# Patient Record
Sex: FEMALE | Race: WHITE | NOT HISPANIC OR LATINO | Employment: OTHER | URBAN - NONMETROPOLITAN AREA
[De-identification: names, ages, dates, MRNs, and addresses within clinical notes are randomized per-mention and may not be internally consistent; named-entity substitution may affect disease eponyms.]

---

## 2023-03-31 LAB
ANION GAP IN SER/PLAS: 9 MMOL/L (ref 10–20)
CALCIUM (MG/DL) IN SER/PLAS: 9.8 MG/DL (ref 8.6–10.3)
CARBON DIOXIDE, TOTAL (MMOL/L) IN SER/PLAS: 28 MMOL/L (ref 21–32)
CHLORIDE (MMOL/L) IN SER/PLAS: 107 MMOL/L (ref 98–107)
CREATININE (MG/DL) IN SER/PLAS: 0.98 MG/DL (ref 0.5–1.05)
GFR FEMALE: 66 ML/MIN/1.73M2
GLUCOSE (MG/DL) IN SER/PLAS: 86 MG/DL (ref 74–99)
POTASSIUM (MMOL/L) IN SER/PLAS: 4.1 MMOL/L (ref 3.5–5.3)
SODIUM (MMOL/L) IN SER/PLAS: 140 MMOL/L (ref 136–145)
UREA NITROGEN (MG/DL) IN SER/PLAS: 18 MG/DL (ref 6–23)

## 2023-05-08 ENCOUNTER — APPOINTMENT (OUTPATIENT)
Dept: PRIMARY CARE | Facility: CLINIC | Age: 58
End: 2023-05-08
Payer: COMMERCIAL

## 2023-06-19 ENCOUNTER — OFFICE VISIT (OUTPATIENT)
Dept: PRIMARY CARE | Facility: CLINIC | Age: 58
End: 2023-06-19
Payer: COMMERCIAL

## 2023-06-19 VITALS
HEIGHT: 65 IN | OXYGEN SATURATION: 99 % | WEIGHT: 178 LBS | SYSTOLIC BLOOD PRESSURE: 126 MMHG | HEART RATE: 87 BPM | DIASTOLIC BLOOD PRESSURE: 78 MMHG | BODY MASS INDEX: 29.66 KG/M2

## 2023-06-19 DIAGNOSIS — F32.0 DEPRESSION, MAJOR, SINGLE EPISODE, MILD (CMS-HCC): Primary | ICD-10-CM

## 2023-06-19 DIAGNOSIS — G43.809 OTHER MIGRAINE WITHOUT STATUS MIGRAINOSUS, NOT INTRACTABLE: ICD-10-CM

## 2023-06-19 DIAGNOSIS — Q21.12 PFO (PATENT FORAMEN OVALE) (HHS-HCC): ICD-10-CM

## 2023-06-19 DIAGNOSIS — R73.03 PREDIABETES: ICD-10-CM

## 2023-06-19 DIAGNOSIS — I15.9 SECONDARY HYPERTENSION: ICD-10-CM

## 2023-06-19 DIAGNOSIS — G47.33 OBSTRUCTIVE SLEEP APNEA, ADULT: ICD-10-CM

## 2023-06-19 DIAGNOSIS — K21.9 CHRONIC GERD: ICD-10-CM

## 2023-06-19 PROBLEM — G43.909 MIGRAINE: Status: ACTIVE | Noted: 2023-06-19

## 2023-06-19 PROCEDURE — 3074F SYST BP LT 130 MM HG: CPT | Performed by: INTERNAL MEDICINE

## 2023-06-19 PROCEDURE — 99214 OFFICE O/P EST MOD 30 MIN: CPT | Performed by: INTERNAL MEDICINE

## 2023-06-19 PROCEDURE — 1036F TOBACCO NON-USER: CPT | Performed by: INTERNAL MEDICINE

## 2023-06-19 PROCEDURE — 3078F DIAST BP <80 MM HG: CPT | Performed by: INTERNAL MEDICINE

## 2023-06-19 RX ORDER — IRBESARTAN 75 MG/1
1 TABLET ORAL DAILY
COMMUNITY

## 2023-06-19 RX ORDER — BUPROPION HYDROCHLORIDE 300 MG/1
300 TABLET ORAL
COMMUNITY
End: 2023-10-05 | Stop reason: SDUPTHER

## 2023-06-19 RX ORDER — TOPIRAMATE 50 MG/1
1 TABLET, FILM COATED ORAL DAILY
COMMUNITY
Start: 2019-10-12 | End: 2023-06-19 | Stop reason: SDUPTHER

## 2023-06-19 RX ORDER — TOPIRAMATE 50 MG/1
50 TABLET, FILM COATED ORAL 2 TIMES DAILY
Qty: 60 TABLET | Refills: 5 | Status: SHIPPED | OUTPATIENT
Start: 2023-06-19 | End: 2023-06-22 | Stop reason: SDUPTHER

## 2023-06-19 RX ORDER — AMLODIPINE BESYLATE 2.5 MG/1
2.5 TABLET ORAL DAILY
Qty: 90 TABLET | Refills: 1 | Status: SHIPPED | OUTPATIENT
Start: 2023-06-19 | End: 2023-12-18 | Stop reason: WASHOUT

## 2023-06-19 RX ORDER — PROTRIPTYLINE HYDROCHLORIDE 5 MG/1
1 TABLET, FILM COATED ORAL 2 TIMES DAILY
COMMUNITY
Start: 2018-06-30 | End: 2023-12-18 | Stop reason: WASHOUT

## 2023-06-19 RX ORDER — ASPIRIN 81 MG/1
81 TABLET ORAL
COMMUNITY

## 2023-06-19 RX ORDER — AMLODIPINE BESYLATE 2.5 MG/1
1 TABLET ORAL DAILY
COMMUNITY
Start: 2022-10-03 | End: 2023-06-19 | Stop reason: SDUPTHER

## 2023-06-19 RX ORDER — METFORMIN HYDROCHLORIDE 500 MG/1
1000 TABLET, EXTENDED RELEASE ORAL 2 TIMES DAILY
COMMUNITY
Start: 2017-06-04 | End: 2023-12-18 | Stop reason: SDUPTHER

## 2023-06-19 RX ORDER — OMEPRAZOLE 40 MG/1
1 CAPSULE, DELAYED RELEASE ORAL
COMMUNITY
Start: 2022-04-19 | End: 2024-02-08

## 2023-06-19 ASSESSMENT — ENCOUNTER SYMPTOMS
PALPITATIONS: 0
BLOOD IN STOOL: 0
WHEEZING: 0
VOMITING: 0
CHEST TIGHTNESS: 0
FATIGUE: 0
SHORTNESS OF BREATH: 0
NAUSEA: 0
BACK PAIN: 0
DIARRHEA: 0
ABDOMINAL PAIN: 0
COUGH: 0
ARTHRALGIAS: 0

## 2023-06-19 NOTE — ASSESSMENT & PLAN NOTE
-She will check her over-the-counter preparation as she might be only taking 20 mg.  She will let us know if she continues to have reflux symptoms despite adequate treatment as we would have her go back to Dr. Stephenson

## 2023-06-19 NOTE — PATIENT INSTRUCTIONS
Please contact us if your headaches are not doing better as we are having you take Topamax twice daily  Please remember to take the omeprazole 40 mg daily and if you continue to have reflux all you need to do is call and we will refer you to Dr. Suero  We will try to track down the results of your last colonoscopy  If everything goes according to plan I will see you back in 6 months and please remember to get fasting lab work prior to that visit

## 2023-06-19 NOTE — ASSESSMENT & PLAN NOTE
-She will continue with her metformin 500 mg daily  -We will run a hemoglobin A1c just prior to her next follow-up visit

## 2023-06-19 NOTE — ASSESSMENT & PLAN NOTE
-Currently well controlled  -We are providing a refill on amlodipine 2.5 mg once daily  -She will continue with Avapro 75 mg daily

## 2023-06-19 NOTE — PROGRESS NOTES
Subjective   Patient ID: Carmen Jay is a 57 y.o. female who presents for No chief complaint on file..  HPI  She is here today for her routine checkup.  She is looking well.  We did conduct a review of systems and she states that she is getting some heartburn symptoms on a more frequent basis.  She been taking over-the-counter omeprazole and I suspect that she might be only taking the 20 mg dose whereas in the prescription she was on 40 mg.  She will call us and let us know.  She has no dysphagia symptoms or alarm type symptoms.  We talked about having her go back to Dr. Suero if she is having continued symptoms despite adequate treatment.  We also reminded that she had a colonoscopy we suspect a little under 10 years ago.  I could not find a copy of that report so we will have her sign to get that sent today.  We also reviewed her laboratory test results from just a couple of months ago and overall I am pleased with her numbers.  She states she has been getting some headaches over the past few days and we are reminded that she does suffer from migraines.  She is on Topamax.  She will call if her migraines continue.  She also continues to wear her CPAP device faithfully.  I will summarize everything in a problem based format.  Review of Systems   Constitutional:  Negative for fatigue.   Respiratory:  Negative for cough, chest tightness, shortness of breath and wheezing.    Cardiovascular:  Negative for chest pain, palpitations and leg swelling.   Gastrointestinal:  Negative for abdominal pain, blood in stool, diarrhea, nausea and vomiting.   Musculoskeletal:  Negative for arthralgias and back pain.     Objective   Physical Exam  Vitals and nursing note reviewed.   Constitutional:       General: She is not in acute distress.     Appearance: Normal appearance.   HENT:      Head: Normocephalic and atraumatic.   Eyes:      Conjunctiva/sclera: Conjunctivae normal.   Cardiovascular:      Rate and Rhythm: Normal rate  and regular rhythm.      Heart sounds: Normal heart sounds.   Pulmonary:      Effort: No respiratory distress.      Breath sounds: No wheezing.   Abdominal:      Palpations: Abdomen is soft.      Tenderness: There is no abdominal tenderness. There is no guarding.   Musculoskeletal:         General: No swelling. Normal range of motion.   Skin:     General: Skin is warm and dry.   Neurological:      General: No focal deficit present.      Mental Status: She is alert and oriented to person, place, and time.   Psychiatric:         Behavior: Behavior normal.       Recent Results (from the past 2016 hour(s))   Basic Metabolic Panel    Collection Time: 03/31/23 10:56 AM   Result Value Ref Range    Glucose 86 74 - 99 mg/dL    Sodium 140 136 - 145 mmol/L    Potassium 4.1 3.5 - 5.3 mmol/L    Chloride 107 98 - 107 mmol/L    Bicarbonate 28 21 - 32 mmol/L    Anion Gap 9 (L) 10 - 20 mmol/L    Urea Nitrogen 18 6 - 23 mg/dL    Creatinine 0.98 0.50 - 1.05 mg/dL    GFR Female 66 >90 mL/min/1.73m2    Calcium 9.8 8.6 - 10.3 mg/dL       Assessment/Plan   Problem List Items Addressed This Visit          Nervous    Obstructive sleep apnea, adult     -She continues to wear her CPAP device faithfully and has derived benefit from its use            Circulatory    Hypertension     -Currently well controlled  -We are providing a refill on amlodipine 2.5 mg once daily  -She will continue with Avapro 75 mg daily         Relevant Medications    amLODIPine (Norvasc) 2.5 mg tablet    PFO (patent foramen ovale)     -She sees Dr. Reed with follow-up echocardiograms annually  -Condition has been stable         Relevant Medications    amLODIPine (Norvasc) 2.5 mg tablet       Digestive    Chronic GERD     -She will check her over-the-counter preparation as she might be only taking 20 mg.  She will let us know if she continues to have reflux symptoms despite adequate treatment as we would have her go back to Dr. Stephenson            Endocrine/Metabolic     Prediabetes     -She will continue with her metformin 500 mg daily  -We will run a hemoglobin A1c just prior to her next follow-up visit         Relevant Orders    Basic Metabolic Panel    Hemoglobin A1C       Other    Depression, major, single episode, mild (CMS/HCC) - Primary     -Doing well and continue with Wellbutrin 300 mg daily         Migraine     -She will continue with her Topamax 50 mg and if headaches continue we can adjust the dose         Relevant Medications    topiramate (Topamax) 50 mg tablet          Katya Martinez,

## 2023-06-22 DIAGNOSIS — G43.809 OTHER MIGRAINE WITHOUT STATUS MIGRAINOSUS, NOT INTRACTABLE: ICD-10-CM

## 2023-06-22 RX ORDER — TOPIRAMATE 50 MG/1
50 TABLET, FILM COATED ORAL 2 TIMES DAILY
Qty: 180 TABLET | Refills: 1 | Status: SHIPPED | OUTPATIENT
Start: 2023-06-22 | End: 2023-12-18 | Stop reason: SDUPTHER

## 2023-10-05 DIAGNOSIS — F32.0 DEPRESSION, MAJOR, SINGLE EPISODE, MILD (CMS-HCC): Primary | ICD-10-CM

## 2023-10-05 RX ORDER — BUPROPION HYDROCHLORIDE 300 MG/1
300 TABLET ORAL
Qty: 90 TABLET | Refills: 1 | Status: SHIPPED | OUTPATIENT
Start: 2023-10-05 | End: 2023-12-18 | Stop reason: SDUPTHER

## 2023-12-07 ENCOUNTER — APPOINTMENT (OUTPATIENT)
Dept: GASTROENTEROLOGY | Facility: CLINIC | Age: 58
End: 2023-12-07
Payer: COMMERCIAL

## 2023-12-18 ENCOUNTER — OFFICE VISIT (OUTPATIENT)
Dept: PRIMARY CARE | Facility: CLINIC | Age: 58
End: 2023-12-18
Payer: COMMERCIAL

## 2023-12-18 ENCOUNTER — LAB (OUTPATIENT)
Dept: LAB | Facility: LAB | Age: 58
End: 2023-12-18
Payer: COMMERCIAL

## 2023-12-18 VITALS
SYSTOLIC BLOOD PRESSURE: 114 MMHG | WEIGHT: 185 LBS | HEART RATE: 96 BPM | BODY MASS INDEX: 30.79 KG/M2 | DIASTOLIC BLOOD PRESSURE: 86 MMHG | OXYGEN SATURATION: 98 %

## 2023-12-18 DIAGNOSIS — Z23 ENCOUNTER FOR IMMUNIZATION: ICD-10-CM

## 2023-12-18 DIAGNOSIS — G43.809 OTHER MIGRAINE WITHOUT STATUS MIGRAINOSUS, NOT INTRACTABLE: ICD-10-CM

## 2023-12-18 DIAGNOSIS — K21.9 CHRONIC GERD: ICD-10-CM

## 2023-12-18 DIAGNOSIS — R73.03 PREDIABETES: Primary | ICD-10-CM

## 2023-12-18 DIAGNOSIS — R73.03 PREDIABETES: ICD-10-CM

## 2023-12-18 DIAGNOSIS — I15.9 SECONDARY HYPERTENSION: ICD-10-CM

## 2023-12-18 DIAGNOSIS — R74.8 ELEVATED VITAMIN B12 LEVEL: ICD-10-CM

## 2023-12-18 DIAGNOSIS — F32.0 DEPRESSION, MAJOR, SINGLE EPISODE, MILD (CMS-HCC): ICD-10-CM

## 2023-12-18 PROBLEM — G45.9 TRANSIENT ISCHEMIC ATTACK: Status: RESOLVED | Noted: 2023-06-27 | Resolved: 2023-12-18

## 2023-12-18 PROBLEM — R79.89 ELEVATED VITAMIN B12 LEVEL: Status: ACTIVE | Noted: 2023-12-18

## 2023-12-18 PROBLEM — K58.9 IRRITABLE BOWEL SYNDROME: Status: RESOLVED | Noted: 2023-06-27 | Resolved: 2023-12-18

## 2023-12-18 LAB
ANION GAP SERPL CALC-SCNC: 10 MMOL/L (ref 10–20)
BUN SERPL-MCNC: 16 MG/DL (ref 6–23)
CALCIUM SERPL-MCNC: 9.5 MG/DL (ref 8.6–10.3)
CHLORIDE SERPL-SCNC: 109 MMOL/L (ref 98–107)
CO2 SERPL-SCNC: 27 MMOL/L (ref 21–32)
CREAT SERPL-MCNC: 1.02 MG/DL (ref 0.5–1.05)
EST. AVERAGE GLUCOSE BLD GHB EST-MCNC: 114 MG/DL
GFR SERPL CREATININE-BSD FRML MDRD: 63 ML/MIN/1.73M*2
GLUCOSE SERPL-MCNC: 86 MG/DL (ref 74–99)
HBA1C MFR BLD: 5.6 %
POTASSIUM SERPL-SCNC: 4.2 MMOL/L (ref 3.5–5.3)
SODIUM SERPL-SCNC: 142 MMOL/L (ref 136–145)

## 2023-12-18 PROCEDURE — 36415 COLL VENOUS BLD VENIPUNCTURE: CPT

## 2023-12-18 PROCEDURE — 99214 OFFICE O/P EST MOD 30 MIN: CPT | Performed by: INTERNAL MEDICINE

## 2023-12-18 PROCEDURE — 1036F TOBACCO NON-USER: CPT | Performed by: INTERNAL MEDICINE

## 2023-12-18 PROCEDURE — 90686 IIV4 VACC NO PRSV 0.5 ML IM: CPT | Performed by: INTERNAL MEDICINE

## 2023-12-18 PROCEDURE — 90471 IMMUNIZATION ADMIN: CPT | Performed by: INTERNAL MEDICINE

## 2023-12-18 PROCEDURE — 80048 BASIC METABOLIC PNL TOTAL CA: CPT

## 2023-12-18 PROCEDURE — 83036 HEMOGLOBIN GLYCOSYLATED A1C: CPT

## 2023-12-18 PROCEDURE — 3079F DIAST BP 80-89 MM HG: CPT | Performed by: INTERNAL MEDICINE

## 2023-12-18 PROCEDURE — 3074F SYST BP LT 130 MM HG: CPT | Performed by: INTERNAL MEDICINE

## 2023-12-18 RX ORDER — BUPROPION HYDROCHLORIDE 300 MG/1
300 TABLET ORAL
Qty: 90 TABLET | Refills: 1 | Status: SHIPPED | OUTPATIENT
Start: 2023-12-18

## 2023-12-18 RX ORDER — METFORMIN HYDROCHLORIDE 500 MG/1
1000 TABLET, EXTENDED RELEASE ORAL
Qty: 360 TABLET | Refills: 1 | Status: SHIPPED | OUTPATIENT
Start: 2023-12-18

## 2023-12-18 RX ORDER — TOPIRAMATE 50 MG/1
50 TABLET, FILM COATED ORAL 2 TIMES DAILY
Qty: 180 TABLET | Refills: 1 | Status: SHIPPED | OUTPATIENT
Start: 2023-12-18 | End: 2024-02-12

## 2023-12-18 ASSESSMENT — PATIENT HEALTH QUESTIONNAIRE - PHQ9
2. FEELING DOWN, DEPRESSED OR HOPELESS: NOT AT ALL
SUM OF ALL RESPONSES TO PHQ9 QUESTIONS 1 AND 2: 0
SUM OF ALL RESPONSES TO PHQ9 QUESTIONS 1 AND 2: 0
1. LITTLE INTEREST OR PLEASURE IN DOING THINGS: NOT AT ALL
2. FEELING DOWN, DEPRESSED OR HOPELESS: NOT AT ALL
1. LITTLE INTEREST OR PLEASURE IN DOING THINGS: NOT AT ALL

## 2023-12-18 ASSESSMENT — ENCOUNTER SYMPTOMS
PALPITATIONS: 0
DIARRHEA: 0
ABDOMINAL PAIN: 0
ROS GI COMMENTS: OCC HEARTBURN
WHEEZING: 0
FATIGUE: 0
BACK PAIN: 0
VOMITING: 0
COUGH: 0
ARTHRALGIAS: 0
SHORTNESS OF BREATH: 0
NAUSEA: 0
BLOOD IN STOOL: 0

## 2023-12-18 NOTE — ASSESSMENT & PLAN NOTE
-She has been on metformin the equivalent of 1000 mg twice daily  -We will contact her once her hemoglobin A1c result comes back

## 2023-12-18 NOTE — PATIENT INSTRUCTIONS
As we discussed once your test results come back I will contact you  We would like to see you back in approximately 6 months for reevaluation and please remember to get lab work done prior to that visit  If you discover that a weight loss drug is covered in your plan please let me know and we can try to work things out

## 2023-12-18 NOTE — PROGRESS NOTES
Subjective   Patient ID: Carmen Jay is a 58 y.o. female who presents for Follow-up (6 MO FUV. ).  HPI  She is here today for her 6-month checkup.  She is having a lot of stress recently and helping her father who has had some problems.  She states as a result her weight is going up and she has expressed interest in getting on one of the GLP-1 inhibitors for weight loss.  I told her the next course of action would be to check with her insurance plan to see which ones are covered as they are notoriously expensive.  We also conducted a full review of systems and she is having problems with her heartburn.  She states she will be seeing her gastroenterologist.  She does take over-the-counter omeprazole because her insurance would not cover prescription for that drug.  She also was discovered to have a very high B12 level despite really not taking a supplement.  She is following with a hematologist.  She also has had some genetic testing for cancers as she does have a strong family history of breast cancer.  She states her sister has a positive BRCA gene.  She also had some changes in her medication since we saw her last time.  She is no longer on the amlodipine or the Vivactil.  Her blood pressure is excellent today.  We are providing refills on the medicines that we normally give her.  We also will be trying to give her the flu vaccine today.  Otherwise she went for some lab work this morning and the results are not back yet so I will be contacting her.  She also had lab work done back in November and I did review that today as well.  If everything is according to plan we will see her back in 6 months for reevaluation.  Review of Systems   Constitutional:  Negative for fatigue.   Respiratory:  Negative for cough, shortness of breath and wheezing.    Cardiovascular:  Negative for chest pain, palpitations and leg swelling.   Gastrointestinal:  Negative for abdominal pain, blood in stool, diarrhea, nausea and  vomiting.        Occ heartburn   Musculoskeletal:  Negative for arthralgias and back pain.     Objective   Physical Exam  Vitals and nursing note reviewed.   Constitutional:       General: She is not in acute distress.     Appearance: Normal appearance.   HENT:      Head: Normocephalic and atraumatic.   Eyes:      Conjunctiva/sclera: Conjunctivae normal.   Cardiovascular:      Rate and Rhythm: Normal rate and regular rhythm.      Heart sounds: Normal heart sounds.   Pulmonary:      Effort: No respiratory distress.      Breath sounds: No wheezing.   Abdominal:      Palpations: Abdomen is soft.      Tenderness: There is no abdominal tenderness. There is no guarding.   Musculoskeletal:         General: No swelling. Normal range of motion.   Skin:     General: Skin is warm and dry.   Neurological:      General: No focal deficit present.      Mental Status: She is alert and oriented to person, place, and time.   Psychiatric:         Behavior: Behavior normal.       Assessment/Plan   Problem List Items Addressed This Visit             ICD-10-CM    Chronic GERD K21.9     -She continues to take omeprazole and she does follow with the gastroenterology         Depression, major, single episode, mild (CMS/HCC) F32.0     -She will continue with the Wellbutrin  mg daily         Relevant Medications    buPROPion XL (Wellbutrin XL) 300 mg 24 hr tablet    Hypertension I10     -Blood pressure is currently well-controlled  -She will remain on Avapro 75 mg daily         Migraine G43.909     -Currently adequately controlled and she will remain on Topamax 50 mg twice daily         Relevant Medications    topiramate (Topamax) 50 mg tablet    Prediabetes - Primary R73.03     -She has been on metformin the equivalent of 1000 mg twice daily  -We will contact her once her hemoglobin A1c result comes back         Relevant Medications    metFORMIN  mg 24 hr tablet    Other Relevant Orders    Basic Metabolic Panel    Hemoglobin A1C     Elevated vitamin B12 level R74.8     -She is currently following with a hematologist, Dr. Yan Martinez, DO

## 2023-12-19 RX ORDER — SEMAGLUTIDE 0.25 MG/.5ML
0.25 INJECTION, SOLUTION SUBCUTANEOUS
Qty: 2 ML | Refills: 1 | Status: SHIPPED | OUTPATIENT
Start: 2023-12-19 | End: 2024-02-08 | Stop reason: ALTCHOICE

## 2023-12-19 NOTE — RESULT ENCOUNTER NOTE
"This is just an FYI.  I called to let her know that her lab work looked good.  She states she also check with her insurance plan and that Wegovy was \"covered \".  So I sent the prescription to her pharmacy of choice and I told her if she is able to secure the medicine that I would like to see her back in approximately 4 weeks to follow-up.  She expressed understanding and will let us know"

## 2024-02-08 ENCOUNTER — OFFICE VISIT (OUTPATIENT)
Dept: GASTROENTEROLOGY | Facility: CLINIC | Age: 59
End: 2024-02-08
Payer: COMMERCIAL

## 2024-02-08 VITALS
HEIGHT: 65 IN | DIASTOLIC BLOOD PRESSURE: 100 MMHG | WEIGHT: 175.8 LBS | BODY MASS INDEX: 29.29 KG/M2 | SYSTOLIC BLOOD PRESSURE: 140 MMHG

## 2024-02-08 DIAGNOSIS — Z12.11 COLON CANCER SCREENING: ICD-10-CM

## 2024-02-08 DIAGNOSIS — K21.9 CHRONIC GERD: Primary | ICD-10-CM

## 2024-02-08 DIAGNOSIS — K21.9 GASTROESOPHAGEAL REFLUX DISEASE, UNSPECIFIED WHETHER ESOPHAGITIS PRESENT: ICD-10-CM

## 2024-02-08 PROCEDURE — 99214 OFFICE O/P EST MOD 30 MIN: CPT | Performed by: INTERNAL MEDICINE

## 2024-02-08 PROCEDURE — 1036F TOBACCO NON-USER: CPT | Performed by: INTERNAL MEDICINE

## 2024-02-08 PROCEDURE — 3080F DIAST BP >= 90 MM HG: CPT | Performed by: INTERNAL MEDICINE

## 2024-02-08 PROCEDURE — 3008F BODY MASS INDEX DOCD: CPT | Performed by: INTERNAL MEDICINE

## 2024-02-08 PROCEDURE — 3077F SYST BP >= 140 MM HG: CPT | Performed by: INTERNAL MEDICINE

## 2024-02-08 RX ORDER — PANTOPRAZOLE SODIUM 40 MG/1
40 TABLET, DELAYED RELEASE ORAL DAILY
Qty: 30 TABLET | Refills: 11 | Status: SHIPPED | OUTPATIENT
Start: 2024-02-08 | End: 2024-04-03 | Stop reason: SDUPTHER

## 2024-02-08 ASSESSMENT — ENCOUNTER SYMPTOMS
CONSTIPATION: 1
EYES NEGATIVE: 1
RESPIRATORY NEGATIVE: 1
NEUROLOGICAL NEGATIVE: 1
CARDIOVASCULAR NEGATIVE: 1
ENDOCRINE NEGATIVE: 1
NAUSEA: 1
DIARRHEA: 1
ABDOMINAL PAIN: 1
HEMATOLOGIC/LYMPHATIC NEGATIVE: 1
CONSTITUTIONAL NEGATIVE: 1

## 2024-02-08 NOTE — PROGRESS NOTES
Subjective   Patient ID: Carmen Jay is a 58 y.o. female who presents for Follow-up (GERD. Patient is taking Nexium but eating TUMS like crazy. Sister with BRCA 1+ double mastectomy since patients last visit. Patient is in line to be tested in May. Aunt  of Colon Cancer. Father with Colon Polyps. ), Diarrhea (Patient states she has increased stress in the past couple months), and Constipation.  Diarrhea   Associated symptoms include abdominal pain.   Constipation  Associated symptoms include abdominal pain, diarrhea and nausea.   Patient is seen today in follow-up.  Was evaluated 1 year ago reflux remains despite weight loss.  7 nocturnal heartburn on a daily basis currently on omeprazole.  Denies any dysphagia.  Patient is due for routine screening colonoscopy continues with moderate constipation.    Review of Systems   Constitutional: Negative.    HENT: Negative.     Eyes: Negative.    Respiratory: Negative.     Cardiovascular: Negative.    Gastrointestinal:  Positive for abdominal pain, constipation, diarrhea and nausea.   Endocrine: Negative.    Genitourinary: Negative.    Neurological: Negative.    Hematological: Negative.        Objective   Physical Exam  Vitals and nursing note reviewed.   Constitutional:       Appearance: Normal appearance.   HENT:      Head: Normocephalic.      Mouth/Throat:      Mouth: Mucous membranes are moist.      Pharynx: Oropharynx is clear.   Eyes:      Conjunctiva/sclera: Conjunctivae normal.      Pupils: Pupils are equal, round, and reactive to light.   Cardiovascular:      Pulses: Normal pulses.      Heart sounds: Normal heart sounds.   Pulmonary:      Effort: Pulmonary effort is normal.      Breath sounds: Normal breath sounds.   Abdominal:      General: Abdomen is flat. Bowel sounds are normal.      Palpations: Abdomen is soft.   Musculoskeletal:      Cervical back: Normal range of motion and neck supple.   Skin:     General: Skin is warm and dry.   Neurological:       General: No focal deficit present.      Mental Status: She is alert and oriented to person, place, and time.   Psychiatric:         Behavior: Behavior normal.         Assessment/Plan   Diagnoses and all orders for this visit:  Chronic GERD  Colon cancer screening  -     Colonoscopy Screening; High Risk Patient; Future  Gastroesophageal reflux disease, unspecified whether esophagitis present  -     EGD; Future    Discontinue omeprazole begin Protonix 40 mg daily arrange EGD and colonoscopy       Sid Suero DO 02/08/24 11:40 AM

## 2024-02-11 DIAGNOSIS — G43.809 OTHER MIGRAINE WITHOUT STATUS MIGRAINOSUS, NOT INTRACTABLE: ICD-10-CM

## 2024-02-12 RX ORDER — TOPIRAMATE 50 MG/1
50 TABLET, FILM COATED ORAL 2 TIMES DAILY
Qty: 180 TABLET | Refills: 1 | Status: SHIPPED | OUTPATIENT
Start: 2024-02-12

## 2024-03-05 ENCOUNTER — HOSPITAL ENCOUNTER (OUTPATIENT)
Dept: GASTROENTEROLOGY | Facility: CLINIC | Age: 59
Setting detail: OUTPATIENT SURGERY
Discharge: HOME | End: 2024-03-05
Payer: COMMERCIAL

## 2024-03-05 VITALS
SYSTOLIC BLOOD PRESSURE: 126 MMHG | TEMPERATURE: 97.1 F | OXYGEN SATURATION: 98 % | DIASTOLIC BLOOD PRESSURE: 85 MMHG | RESPIRATION RATE: 18 BRPM | BODY MASS INDEX: 29.42 KG/M2 | WEIGHT: 176.8 LBS | HEART RATE: 56 BPM

## 2024-03-05 DIAGNOSIS — Z12.11 COLON CANCER SCREENING: Primary | ICD-10-CM

## 2024-03-05 DIAGNOSIS — K21.9 GASTROESOPHAGEAL REFLUX DISEASE, UNSPECIFIED WHETHER ESOPHAGITIS PRESENT: ICD-10-CM

## 2024-03-05 PROCEDURE — 43239 EGD BIOPSY SINGLE/MULTIPLE: CPT | Performed by: INTERNAL MEDICINE

## 2024-03-05 PROCEDURE — 3700000012 HC SEDATION LEVEL 5+ TIME - INITIAL 15 MINUTES 5/> YEARS

## 2024-03-05 PROCEDURE — 45385 COLONOSCOPY W/LESION REMOVAL: CPT | Performed by: INTERNAL MEDICINE

## 2024-03-05 PROCEDURE — 3700000013 HC SEDATION LEVEL 5+ TIME - EACH ADDITIONAL 15 MINUTES

## 2024-03-05 PROCEDURE — 88305 TISSUE EXAM BY PATHOLOGIST: CPT | Performed by: PATHOLOGY

## 2024-03-05 PROCEDURE — 2500000004 HC RX 250 GENERAL PHARMACY W/ HCPCS (ALT 636 FOR OP/ED): Performed by: INTERNAL MEDICINE

## 2024-03-05 PROCEDURE — 88305 TISSUE EXAM BY PATHOLOGIST: CPT | Mod: TC,SUR,SAMLAB,WESLAB | Performed by: INTERNAL MEDICINE

## 2024-03-05 PROCEDURE — 7100000009 HC PHASE TWO TIME - INITIAL BASE CHARGE

## 2024-03-05 PROCEDURE — 99152 MOD SED SAME PHYS/QHP 5/>YRS: CPT | Performed by: INTERNAL MEDICINE

## 2024-03-05 PROCEDURE — 2500000005 HC RX 250 GENERAL PHARMACY W/O HCPCS: Performed by: INTERNAL MEDICINE

## 2024-03-05 PROCEDURE — 99153 MOD SED SAME PHYS/QHP EA: CPT | Performed by: INTERNAL MEDICINE

## 2024-03-05 PROCEDURE — 7100000010 HC PHASE TWO TIME - EACH INCREMENTAL 1 MINUTE

## 2024-03-05 RX ORDER — MEPERIDINE HYDROCHLORIDE 25 MG/ML
INJECTION INTRAMUSCULAR; INTRAVENOUS; SUBCUTANEOUS AS NEEDED
Status: COMPLETED | OUTPATIENT
Start: 2024-03-05 | End: 2024-03-05

## 2024-03-05 RX ORDER — MIDAZOLAM HYDROCHLORIDE 5 MG/ML
INJECTION, SOLUTION INTRAMUSCULAR; INTRAVENOUS AS NEEDED
Status: COMPLETED | OUTPATIENT
Start: 2024-03-05 | End: 2024-03-05

## 2024-03-05 RX ORDER — SODIUM CHLORIDE, SODIUM LACTATE, POTASSIUM CHLORIDE, CALCIUM CHLORIDE 600; 310; 30; 20 MG/100ML; MG/100ML; MG/100ML; MG/100ML
20 INJECTION, SOLUTION INTRAVENOUS CONTINUOUS
Status: DISCONTINUED | OUTPATIENT
Start: 2024-03-05 | End: 2024-03-06 | Stop reason: HOSPADM

## 2024-03-05 RX ADMIN — MEPERIDINE HYDROCHLORIDE 25 MG: 25 INJECTION INTRAMUSCULAR; INTRAVENOUS; SUBCUTANEOUS at 12:03

## 2024-03-05 RX ADMIN — SODIUM CHLORIDE, POTASSIUM CHLORIDE, SODIUM LACTATE AND CALCIUM CHLORIDE 20 ML/HR: 600; 310; 30; 20 INJECTION, SOLUTION INTRAVENOUS at 11:08

## 2024-03-05 RX ADMIN — MIDAZOLAM HYDROCHLORIDE 2.5 MG: 5 INJECTION, SOLUTION INTRAMUSCULAR; INTRAVENOUS at 12:07

## 2024-03-05 RX ADMIN — BENZOCAINE, BUTAMBEN, AND TETRACAINE HYDROCHLORIDE 2 SPRAY: .028; .004; .004 AEROSOL, SPRAY TOPICAL at 12:00

## 2024-03-05 RX ADMIN — MEPERIDINE HYDROCHLORIDE 25 MG: 25 INJECTION INTRAMUSCULAR; INTRAVENOUS; SUBCUTANEOUS at 12:07

## 2024-03-05 RX ADMIN — GLUCAGON HYDROCHLORIDE 1 MG: KIT at 12:16

## 2024-03-05 RX ADMIN — MIDAZOLAM HYDROCHLORIDE 1.5 MG: 5 INJECTION, SOLUTION INTRAMUSCULAR; INTRAVENOUS at 12:24

## 2024-03-05 RX ADMIN — MIDAZOLAM HYDROCHLORIDE 1 MG: 5 INJECTION, SOLUTION INTRAMUSCULAR; INTRAVENOUS at 12:18

## 2024-03-05 RX ADMIN — MIDAZOLAM HYDROCHLORIDE 2.5 MG: 5 INJECTION, SOLUTION INTRAMUSCULAR; INTRAVENOUS at 12:03

## 2024-03-05 ASSESSMENT — PAIN SCALES - GENERAL
PAINLEVEL_OUTOF10: 0 - NO PAIN
PAINLEVEL_OUTOF10: 2
PAINLEVEL_OUTOF10: 0 - NO PAIN

## 2024-03-05 ASSESSMENT — PAIN - FUNCTIONAL ASSESSMENT
PAIN_FUNCTIONAL_ASSESSMENT: 0-10

## 2024-03-05 ASSESSMENT — COLUMBIA-SUICIDE SEVERITY RATING SCALE - C-SSRS
6. HAVE YOU EVER DONE ANYTHING, STARTED TO DO ANYTHING, OR PREPARED TO DO ANYTHING TO END YOUR LIFE?: NO
2. HAVE YOU ACTUALLY HAD ANY THOUGHTS OF KILLING YOURSELF?: NO

## 2024-03-05 NOTE — DISCHARGE INSTRUCTIONS
Patient Instructions after a Colonoscopy      The anesthetics, sedatives or narcotics which were given to you today will be acting in your body for the next 24 hours, so you might feel a little sleepy or groggy.  This feeling should slowly wear off. Carefully read and follow the instructions.     You received sedation today:  - Do not drive or operate any machinery or power tools of any kind.   - No alcoholic beverages today, not even beer or wine.  - Do not make any important decisions or sign any legal documents.  - No over the counter medications that contain alcohol or that may cause drowsiness.  - Do not make any important decisions or sign any legal documents.    While it is common to experience mild to moderate abdominal distention, gas, or belching after your procedure, if any of these symptoms occur following discharge from the GI Lab or within one week of having your procedure, call the [facility] to be advised whether a visit to your nearest Urgent Care or Emergency Department is indicated.  Take this paper with you if you go.     - If you develop an allergic reaction to the medications that were given during your procedure such as difficulty breathing, rash, hives, severe nausea, vomiting or lightheadedness.  - If you experience chest pain, shortness of breath, severe abdominal pain, fevers and chills.  -If you develop signs and symptoms of bleeding such as blood in your spit, if your stools turn black, tarry, or bloody  - If you have not urinated within 8 hours following your procedure.  - If your IV site becomes painful, red, inflamed, or looks infected.    If you received a biopsy/polypectomy/sphincterotomy the following instructions apply below:    __ Do not use Aspirin containing products, non-steroidal medications or anti-coagulants for one week following your procedure. (Examples of these types of medications are: Advil, Arthrotec, Aleve, Coumadin, Ecotrin, Heparin, Ibuprofen,  Indocin, Motrin, Naprosyn, Nuprin, Plavix, Vioxx, and Voltarin, or their generic forms.  This list is not all-inclusive.  Check with your physician or pharmacist before resuming medications.)   __ Eat a soft diet today.  Avoid foods that are poorly digested for the next 24 hours.  These foods would include: nuts, beans, lettuce, red meats, and fried foods. Start with liquids and advance your diet as tolerated, gradually work up to eating solids.   __ Do not have a Barium Study or Enema for one week.    Your physician recommends the additional following instructions:    -You have a contact number available for emergencies. The signs and symptoms of potential delayed complications were discussed with you. You may return to normal activities tomorrow.  -Resume your previous diet.  -Continue your present medications.   -We are waiting for your pathology results.  -Your physician has recommended a repeat colonoscopy (date to be determined after pending pathology results are reviewed) for surveillance based on pathology results.  -The findings and recommendations have been discussed with you.  -The findings and recommendations were discussed with your family.  - Please see Medication Reconciliation Form for new medication/medications prescribed.       If you experience any problems or have any questions following discharge from the GI Lab, please call:        Nurse Signature                                                                        Date___________________                                                                            Patient/Responsible Party Signature                                        Date___________________Patient Instructions after an endoscopy or colonoscopy      The anesthetics, sedatives or narcotics which were given to you today will be acting in your body for the next 24 hours, so you might feel a little sleepy or groggy.  This feeling should slowly wear off. Carefully read and follow  the instructions.     You received sedation today:  - Do not drive or operate any machinery or power tools of any kind.   - No alcoholic beverages today, not even beer or wine.  - Do not make any important decisions or sign any legal documents.  - No over the counter medications that contain alcohol or that may cause drowsiness.  - Do not make any important decisions or sign any legal documents.    While it is common to experience mild to moderate abdominal distention, gas, or belching after your procedure, if any of these symptoms occur following discharge from the GI Lab or within one week of having your procedure, call the [facility] to be advised whether a visit to your nearest Urgent Care or Emergency Department is indicated.  Take this paper with you if you go.     - If you develop an allergic reaction to the medications that were given during your procedure such as difficulty breathing, rash, hives, severe nausea, vomiting or lightheadedness.- If you experience chest pain, shortness of breath, severe abdominal pain, fevers and chills.    -If you develop signs and symptoms of bleeding such as blood in your spit, if your stools turn black, tarry, or bloody    - If you have not urinated within 8 hours following your procedure.- If your IV site becomes painful, red, inflamed, or looks infected.

## 2024-03-05 NOTE — H&P
History Of Present Illness  Carmen Jay is a 58 y.o. female presenting with GERD presents for screening EGD and screening colonoscopy..     Past Medical History  Past Medical History:   Diagnosis Date    Depression     Hypertension     Insulin resistance     Irritable bowel syndrome 2023    Migraines     Patent foramen ovale     Patent foramen ovale    Personal history of other diseases of the circulatory system     History of atrial septal defect    PFO (patent foramen ovale) 2016    Formatting of this note might be different from the original. Closed 2009    Recurrent tia's Echo  normal no shunt. Last Assessment & Plan: Formatting of this note might be different from the original. Doing remarkably well she does have metabolic syndrome which I discussed the implications of increased risk of atherosclerosis but with a coronary score of 0 and her 10-year risk 1% LDL of 84 do    PONV (postoperative nausea and vomiting)     Sleep apnea     Transient ischemic attack 2023     Surgical History  Past Surgical History:   Procedure Laterality Date    CARPAL TUNNEL RELEASE       SECTION, LOW TRANSVERSE      CHOLECYSTECTOMY      HYSTERECTOMY      OTHER SURGICAL HISTORY  2019    Endometrial ablation    WRIST SURGERY       Social History  She reports that she has never smoked. She has never used smokeless tobacco. She reports that she does not currently use alcohol. She reports that she does not use drugs.    Family History  Family History   Problem Relation Name Age of Onset    Diabetes Mother Yamilex     Heart disease Mother Yamilex     No Known Problems Father      Cancer Paternal Grandfather Marcelo     Cancer Paternal Grandmother Genie     Ovarian cancer Paternal Grandmother Genie     Cancer Father's Sister Makenna     Colon polyps Sister Sweta         Allergies  Allergies   Allergen Reactions    Sulfamethoxazole-Trimethoprim Hives     Review of Systems  Pre-sedation Evaluation:  ASA  Classification - ASA 2 - Patient with mild systemic disease with no functional limitations  Mallampati Score - II (hard and soft palate, upper portion of tonsils anduvula visible)    Physical Exam  Vitals and nursing note reviewed.   Constitutional:       Appearance: Normal appearance.   HENT:      Head: Normocephalic.      Mouth/Throat:      Mouth: Mucous membranes are moist.      Pharynx: Oropharynx is clear.   Eyes:      Conjunctiva/sclera: Conjunctivae normal.      Pupils: Pupils are equal, round, and reactive to light.   Cardiovascular:      Pulses: Normal pulses.      Heart sounds: Normal heart sounds.   Pulmonary:      Effort: Pulmonary effort is normal.      Breath sounds: Normal breath sounds.   Abdominal:      General: Abdomen is flat. Bowel sounds are normal.      Palpations: Abdomen is soft.   Musculoskeletal:      Cervical back: Normal range of motion and neck supple.   Skin:     General: Skin is warm and dry.   Neurological:      General: No focal deficit present.      Mental Status: She is alert and oriented to person, place, and time.   Psychiatric:         Behavior: Behavior normal.          Last Recorded Vitals  Blood pressure 127/85, pulse 61, temperature 36.8 °C (98.2 °F), temperature source Temporal, resp. rate 18, weight 80.2 kg (176 lb 12.8 oz), SpO2 98 %.     Assessment/Plan   Problem List Items Addressed This Visit       Colon cancer screening - Primary    Relevant Orders    Colonoscopy Screening; High Risk Patient     Other Visit Diagnoses       Gastroesophageal reflux disease, unspecified whether esophagitis present        Relevant Orders    EGD               PTA/Current Medications:  (Not in a hospital admission)    Current Outpatient Medications   Medication Sig Dispense Refill    aspirin 81 mg EC tablet Take 1 tablet (81 mg) by mouth once daily.      buPROPion XL (Wellbutrin XL) 300 mg 24 hr tablet Take 1 tablet (300 mg) by mouth once daily. 90 tablet 1    irbesartan (Avapro) 75 mg  tablet Take 1 tablet (75 mg) by mouth once daily.      metFORMIN  mg 24 hr tablet Take 2 tablets (1,000 mg) by mouth 2 times a day with meals. 360 tablet 1    pantoprazole (ProtoNix) 40 mg EC tablet Take 1 tablet (40 mg) by mouth once daily. Do not crush, chew, or split. 30 tablet 11    topiramate (Topamax) 50 mg tablet TAKE 1 TABLET TWICE A  tablet 1     Current Facility-Administered Medications   Medication Dose Route Frequency Provider Last Rate Last Admin    lactated Ringer's infusion  20 mL/hr intravenous Continuous Sid Suero DO 20 mL/hr at 03/05/24 1108 20 mL/hr at 03/05/24 1108     Sid Suero DO

## 2024-03-12 LAB
LABORATORY COMMENT REPORT: NORMAL
PATH REPORT.FINAL DX SPEC: NORMAL
PATH REPORT.GROSS SPEC: NORMAL
PATH REPORT.TOTAL CANCER: NORMAL

## 2024-03-20 ENCOUNTER — TELEPHONE (OUTPATIENT)
Dept: GASTROENTEROLOGY | Facility: CLINIC | Age: 59
End: 2024-03-20
Payer: COMMERCIAL

## 2024-03-20 NOTE — TELEPHONE ENCOUNTER
PATIENT NOTIFIED AND VERBALIZED UNDERSTANDING     5 year repeat card made and filed    ----- Message from Sid Suero DO sent at 3/12/2024 12:18 PM EDT -----  Upper endoscopy revealed reflux change without Paez's.  No evidence of hiatal hernia.  Biopsies from duodenum revealed peptic duodenitis, this is seen in chronic NSAID use and can be seen with aspirin.  No Paez's or H. pylori noted.    Colonoscopy revealed a small adenomatous polyp for which repeat colonoscopy will be necessary in 5 years.

## 2024-04-03 DIAGNOSIS — K21.9 CHRONIC GERD: ICD-10-CM

## 2024-04-05 RX ORDER — PANTOPRAZOLE SODIUM 40 MG/1
40 TABLET, DELAYED RELEASE ORAL DAILY
Qty: 90 TABLET | Refills: 3 | Status: SHIPPED | OUTPATIENT
Start: 2024-04-05 | End: 2025-04-05

## 2024-06-17 ENCOUNTER — LAB (OUTPATIENT)
Dept: LAB | Facility: LAB | Age: 59
End: 2024-06-17
Payer: COMMERCIAL

## 2024-06-17 ENCOUNTER — APPOINTMENT (OUTPATIENT)
Dept: PRIMARY CARE | Facility: CLINIC | Age: 59
End: 2024-06-17
Payer: COMMERCIAL

## 2024-06-17 VITALS
HEART RATE: 70 BPM | SYSTOLIC BLOOD PRESSURE: 124 MMHG | DIASTOLIC BLOOD PRESSURE: 86 MMHG | OXYGEN SATURATION: 98 % | HEIGHT: 65 IN | BODY MASS INDEX: 30.66 KG/M2 | WEIGHT: 184 LBS

## 2024-06-17 DIAGNOSIS — G43.809 OTHER MIGRAINE WITHOUT STATUS MIGRAINOSUS, NOT INTRACTABLE: ICD-10-CM

## 2024-06-17 DIAGNOSIS — R73.03 PREDIABETES: ICD-10-CM

## 2024-06-17 DIAGNOSIS — R74.8 ELEVATED VITAMIN B12 LEVEL: ICD-10-CM

## 2024-06-17 DIAGNOSIS — I15.9 SECONDARY HYPERTENSION: Primary | ICD-10-CM

## 2024-06-17 DIAGNOSIS — K21.9 CHRONIC GERD: ICD-10-CM

## 2024-06-17 PROBLEM — Z12.11 COLON CANCER SCREENING: Status: RESOLVED | Noted: 2024-02-08 | Resolved: 2024-06-17

## 2024-06-17 PROBLEM — F32.0 DEPRESSION, MAJOR, SINGLE EPISODE, MILD (CMS-HCC): Status: RESOLVED | Noted: 2023-06-19 | Resolved: 2024-06-17

## 2024-06-17 LAB
ANION GAP SERPL CALC-SCNC: 11 MMOL/L (ref 10–20)
BUN SERPL-MCNC: 21 MG/DL (ref 6–23)
CALCIUM SERPL-MCNC: 9.5 MG/DL (ref 8.6–10.3)
CHLORIDE SERPL-SCNC: 107 MMOL/L (ref 98–107)
CO2 SERPL-SCNC: 26 MMOL/L (ref 21–32)
CREAT SERPL-MCNC: 0.94 MG/DL (ref 0.5–1.05)
EGFRCR SERPLBLD CKD-EPI 2021: 70 ML/MIN/1.73M*2
EST. AVERAGE GLUCOSE BLD GHB EST-MCNC: 111 MG/DL
GLUCOSE SERPL-MCNC: 83 MG/DL (ref 74–99)
HBA1C MFR BLD: 5.5 %
POTASSIUM SERPL-SCNC: 4.3 MMOL/L (ref 3.5–5.3)
SODIUM SERPL-SCNC: 140 MMOL/L (ref 136–145)

## 2024-06-17 PROCEDURE — 3079F DIAST BP 80-89 MM HG: CPT | Performed by: INTERNAL MEDICINE

## 2024-06-17 PROCEDURE — 99214 OFFICE O/P EST MOD 30 MIN: CPT | Performed by: INTERNAL MEDICINE

## 2024-06-17 PROCEDURE — 3008F BODY MASS INDEX DOCD: CPT | Performed by: INTERNAL MEDICINE

## 2024-06-17 PROCEDURE — 83036 HEMOGLOBIN GLYCOSYLATED A1C: CPT

## 2024-06-17 PROCEDURE — 3074F SYST BP LT 130 MM HG: CPT | Performed by: INTERNAL MEDICINE

## 2024-06-17 PROCEDURE — 36415 COLL VENOUS BLD VENIPUNCTURE: CPT

## 2024-06-17 PROCEDURE — 80048 BASIC METABOLIC PNL TOTAL CA: CPT

## 2024-06-17 PROCEDURE — 1036F TOBACCO NON-USER: CPT | Performed by: INTERNAL MEDICINE

## 2024-06-17 RX ORDER — PANTOPRAZOLE SODIUM 40 MG/1
40 TABLET, DELAYED RELEASE ORAL DAILY
Qty: 90 TABLET | Refills: 1 | Status: SHIPPED | OUTPATIENT
Start: 2024-06-17 | End: 2025-06-17

## 2024-06-17 RX ORDER — TOPIRAMATE 50 MG/1
50 TABLET, FILM COATED ORAL 2 TIMES DAILY
Qty: 180 TABLET | Refills: 1 | Status: SHIPPED | OUTPATIENT
Start: 2024-06-17

## 2024-06-17 RX ORDER — IRBESARTAN 75 MG/1
75 TABLET ORAL DAILY
Qty: 90 TABLET | Refills: 1 | Status: SHIPPED | OUTPATIENT
Start: 2024-06-17

## 2024-06-17 RX ORDER — METFORMIN HYDROCHLORIDE 500 MG/1
1000 TABLET, EXTENDED RELEASE ORAL
Qty: 360 TABLET | Refills: 1 | Status: SHIPPED | OUTPATIENT
Start: 2024-06-17

## 2024-06-17 ASSESSMENT — ENCOUNTER SYMPTOMS
VOMITING: 0
SHORTNESS OF BREATH: 0
NAUSEA: 0
ABDOMINAL PAIN: 0
ARTHRALGIAS: 0
DIARRHEA: 0
BLOOD IN STOOL: 0
BACK PAIN: 0
FATIGUE: 0
COUGH: 0
CHEST TIGHTNESS: 0
PALPITATIONS: 0
WHEEZING: 0

## 2024-06-17 ASSESSMENT — PATIENT HEALTH QUESTIONNAIRE - PHQ9
SUM OF ALL RESPONSES TO PHQ9 QUESTIONS 1 AND 2: 0
2. FEELING DOWN, DEPRESSED OR HOPELESS: NOT AT ALL
1. LITTLE INTEREST OR PLEASURE IN DOING THINGS: NOT AT ALL

## 2024-06-17 NOTE — PATIENT INSTRUCTIONS
As we discussed once your test results come back I will contact you via [employer] so please be looking up for a message  We sent refills on all your medications and please let us know if there are any issues with the pharmacy  Otherwise we would like to see you back in 6 months and just prior to that visit please remember to go for fasting lab work

## 2024-06-17 NOTE — PROGRESS NOTES
Subjective   Patient ID: Carmen Jay is a 58 y.o. female who presents for Follow-up (6 MO FUV).  HPI  She is here today for her routine 6-month checkup.  She got her lab drawn today and unfortunately the results are not back.  I have agreed to contact her with the results.  Otherwise her blood pressure is excellent today.  We briefly discussed her health conditions including her diagnosis of prediabetes.  We placed her on metformin sometime ago and so far her hemoglobin A1c's have remained within acceptable range.  We will continue to monitor.  She has also been seeing a hematologist, Dr. Atul Heredia for her high B12 level.  She states there is still trying to figure out why her levels are still high.  She also takes Topamax for her migraines which has helped to control her condition.  She also continues to take pantoprazole for heartburn which appears to be well-controlled.  She also has done a great job keeping up with cancer screening and had a recent mammogram.  She was also tested for BRCA gene which thankfully came back negative.  She also has had a recent colonoscopy and EGD with Dr. Suero.  We will give her refills on all her medications and if everything goes according to plan we will see her back in 6 months for another checkup.  Review of Systems   Constitutional:  Negative for fatigue.   Respiratory:  Negative for cough, chest tightness, shortness of breath and wheezing.    Cardiovascular:  Negative for chest pain, palpitations and leg swelling.   Gastrointestinal:  Negative for abdominal pain, blood in stool, diarrhea, nausea and vomiting.   Musculoskeletal:  Negative for arthralgias and back pain.     Objective   Physical Exam  Vitals and nursing note reviewed.   Constitutional:       General: She is not in acute distress.     Appearance: Normal appearance.   HENT:      Head: Normocephalic and atraumatic.   Eyes:      Conjunctiva/sclera: Conjunctivae normal.   Cardiovascular:      Rate and  Rhythm: Normal rate and regular rhythm.      Heart sounds: Normal heart sounds.   Pulmonary:      Effort: No respiratory distress.      Breath sounds: No wheezing.   Abdominal:      Palpations: Abdomen is soft.      Tenderness: There is no abdominal tenderness. There is no guarding.   Musculoskeletal:         General: No swelling. Normal range of motion.   Skin:     General: Skin is warm and dry.   Neurological:      General: No focal deficit present.      Mental Status: She is alert and oriented to person, place, and time.   Psychiatric:         Behavior: Behavior normal.       Assessment/Plan   Problem List Items Addressed This Visit             ICD-10-CM    Chronic GERD K21.9     -Her acid reflux appears to be under good control         Relevant Medications    pantoprazole (ProtoNix) 40 mg EC tablet    Hypertension - Primary I10     -Currently well-controlled so we will continue with her current antihypertensive regimen         Relevant Medications    irbesartan (Avapro) 75 mg tablet    Migraine G43.909     -Appears to be under good control while taking topiramate 50 mg twice daily         Relevant Medications    topiramate (Topamax) 50 mg tablet    Prediabetes R73.03     -I have agreed to call her with her hemoglobin A1c from today and we will continue with the metformin  -We will see her back in approximately 6 months and she will get another hemoglobin A1c         Relevant Medications    metFORMIN  mg 24 hr tablet    Other Relevant Orders    Basic Metabolic Panel    Hemoglobin A1C    Elevated vitamin B12 level R74.8     -She is following with Dr. Atul Heredia on investigating the source of her high B12 levels               Katya Martinez DO

## 2024-06-17 NOTE — ASSESSMENT & PLAN NOTE
-I have agreed to call her with her hemoglobin A1c from today and we will continue with the metformin  -We will see her back in approximately 6 months and she will get another hemoglobin A1c

## 2024-07-05 DIAGNOSIS — G43.809 OTHER MIGRAINE WITHOUT STATUS MIGRAINOSUS, NOT INTRACTABLE: ICD-10-CM

## 2024-07-08 RX ORDER — TOPIRAMATE 50 MG/1
50 TABLET, FILM COATED ORAL 2 TIMES DAILY
Qty: 180 TABLET | Refills: 1 | Status: SHIPPED | OUTPATIENT
Start: 2024-07-08

## 2024-10-14 ENCOUNTER — APPOINTMENT (OUTPATIENT)
Age: 59
End: 2024-10-14
Payer: COMMERCIAL

## 2024-10-14 VITALS
BODY MASS INDEX: 32.15 KG/M2 | WEIGHT: 193 LBS | OXYGEN SATURATION: 94 % | SYSTOLIC BLOOD PRESSURE: 132 MMHG | DIASTOLIC BLOOD PRESSURE: 86 MMHG | HEIGHT: 65 IN | HEART RATE: 64 BPM

## 2024-10-14 DIAGNOSIS — I15.9 SECONDARY HYPERTENSION: ICD-10-CM

## 2024-10-14 DIAGNOSIS — R47.1 DYSARTHRIA: Primary | ICD-10-CM

## 2024-10-14 DIAGNOSIS — G47.33 OBSTRUCTIVE SLEEP APNEA, ADULT: ICD-10-CM

## 2024-10-14 PROCEDURE — 3079F DIAST BP 80-89 MM HG: CPT | Performed by: INTERNAL MEDICINE

## 2024-10-14 PROCEDURE — 3008F BODY MASS INDEX DOCD: CPT | Performed by: INTERNAL MEDICINE

## 2024-10-14 PROCEDURE — 3075F SYST BP GE 130 - 139MM HG: CPT | Performed by: INTERNAL MEDICINE

## 2024-10-14 PROCEDURE — 1036F TOBACCO NON-USER: CPT | Performed by: INTERNAL MEDICINE

## 2024-10-14 PROCEDURE — 99213 OFFICE O/P EST LOW 20 MIN: CPT | Performed by: INTERNAL MEDICINE

## 2024-10-14 ASSESSMENT — ENCOUNTER SYMPTOMS
NAUSEA: 0
PALPITATIONS: 0
FATIGUE: 1
VOMITING: 0
BLOOD IN STOOL: 0
DIARRHEA: 0
WHEEZING: 0
BACK PAIN: 0
COUGH: 0
SHORTNESS OF BREATH: 0
ABDOMINAL PAIN: 0
ARTHRALGIAS: 0

## 2024-10-14 NOTE — ASSESSMENT & PLAN NOTE
-Symptoms began on October 5  -She was admitted to [facility] through October 7 and had extensive testing including neurologic and cardiac evaluations.  -She has returned home and complains of significant fatigue but otherwise has no focal neurologic deficits  -She is being referred to neurology and an EEG is being ordered  -She has had a history of a PFO closure due to TIA approximately 10 years ago  -She will continue with baby aspirin once daily

## 2024-10-14 NOTE — PROGRESS NOTES
"Subjective   Patient ID: Carmen Jay is a 58 y.o. female who presents for Follow-up (ER follow up. ).  HPI  She is here today for follow-up after a recent hospitalization at [facility].  On October 5 she had developed some slurred speech with dizziness while attending a game.  She was taken to the emergency department and transferred to [place].  There she had extensive testing and was seen by both neurology and cardiology.  She was safely discharged to home with follow-up on October 7.  Her exact diagnosis was not revealed but they have recommended she have a follow-up EEG and see neurology.  We will help facilitate that test and referral.  We are reminded that approximately 10 years ago she had a \"neurologic event \"which led to a PFO closure.  She is also been diagnosed with migraine headaches.  Today she complains of significant fatigue but otherwise has no focal neurologic deficits.  She actually will be having a sleep study tomorrow to assess her sleep apnea.  She states she questions her safety with driving so I told her to exercise great caution.  We did conduct a review of systems and we will be awaiting her final diagnosis determination once she has had her assessment with neurology.  Review of Systems   Constitutional:  Positive for fatigue.   Respiratory:  Negative for cough, shortness of breath and wheezing.    Cardiovascular:  Negative for chest pain, palpitations and leg swelling.   Gastrointestinal:  Negative for abdominal pain, blood in stool, diarrhea, nausea and vomiting.   Musculoskeletal:  Negative for arthralgias and back pain.     Objective   Physical Exam  Vitals and nursing note reviewed.   Constitutional:       General: She is not in acute distress.     Appearance: Normal appearance.   HENT:      Head: Normocephalic and atraumatic.   Eyes:      Conjunctiva/sclera: Conjunctivae normal.   Cardiovascular:      Rate and Rhythm: Normal rate and regular rhythm.      Heart " sounds: Normal heart sounds.   Pulmonary:      Effort: No respiratory distress.      Breath sounds: No wheezing.   Abdominal:      Palpations: Abdomen is soft.      Tenderness: There is no abdominal tenderness. There is no guarding.   Musculoskeletal:         General: No swelling. Normal range of motion.   Skin:     General: Skin is warm and dry.   Neurological:      General: No focal deficit present.      Mental Status: She is alert and oriented to person, place, and time.   Psychiatric:         Behavior: Behavior normal.       Assessment/Plan   Problem List Items Addressed This Visit             ICD-10-CM    Hypertension I10     -Blood pressure is currently adequately controlled and we will continue to monitor         Obstructive sleep apnea, adult G47.33     -She has been scheduled for a formal sleep study which will occur in the very near future  -We discussed exercising great caution when driving         Dysarthria - Primary R47.1     -Symptoms began on October 5  -She was admitted to [facility] through October 7 and had extensive testing including neurologic and cardiac evaluations.  -She has returned home and complains of significant fatigue but otherwise has no focal neurologic deficits  -She is being referred to neurology and an EEG is being ordered  -She has had a history of a PFO closure due to TIA approximately 10 years ago  -She will continue with baby aspirin once daily         Relevant Orders    Referral to Neurology    EEG   Patient instructions  As we discussed I have ordered an EEG to be done at your earliest convenience and the staff will be helping to get that scheduled.  I have also placed a referral to neurology to complete your assessment  Please continue taking a baby aspirin once daily with food  Please call if you have any unusual symptoms or concerns       Katya Martinez, DO

## 2024-10-14 NOTE — ASSESSMENT & PLAN NOTE
-She has been scheduled for a formal sleep study which will occur in the very near future  -We discussed exercising great caution when driving

## 2024-10-14 NOTE — PATIENT INSTRUCTIONS
As we discussed I have ordered an EEG to be done at your earliest convenience and the staff will be helping to get that scheduled.  I have also placed a referral to neurology to complete your assessment  Please continue taking a baby aspirin once daily with food  Please call if you have any unusual symptoms or concerns

## 2024-10-22 ENCOUNTER — TELEPHONE (OUTPATIENT)
Age: 59
End: 2024-10-22
Payer: COMMERCIAL

## 2024-10-22 NOTE — TELEPHONE ENCOUNTER
An order has already been placed.  Can we print it and fax it to the facility of her choosing?  Thank you

## 2024-10-22 NOTE — TELEPHONE ENCOUNTER
Patient called and left . She would like to know if the referral to the EEG can be changed to [facility] in [place] since she lives in [facility][1]?

## 2024-11-18 DIAGNOSIS — R47.1 DYSARTHRIA: Primary | ICD-10-CM

## 2024-12-03 ENCOUNTER — HOSPITAL ENCOUNTER (OUTPATIENT)
Dept: NEUROLOGY | Facility: HOSPITAL | Age: 59
Discharge: HOME | End: 2024-12-03
Payer: COMMERCIAL

## 2024-12-03 DIAGNOSIS — R47.1 DYSARTHRIA: ICD-10-CM

## 2024-12-03 PROCEDURE — 95819 EEG AWAKE AND ASLEEP: CPT | Performed by: PSYCHIATRY & NEUROLOGY

## 2024-12-03 PROCEDURE — 9420000001 HC RT PATIENT EDUCATION 5 MIN

## 2024-12-03 PROCEDURE — 95819 EEG AWAKE AND ASLEEP: CPT

## 2024-12-17 ENCOUNTER — APPOINTMENT (OUTPATIENT)
Dept: PRIMARY CARE | Facility: CLINIC | Age: 59
End: 2024-12-17
Payer: COMMERCIAL

## 2024-12-17 ENCOUNTER — LAB (OUTPATIENT)
Facility: LAB | Age: 59
End: 2024-12-17
Payer: COMMERCIAL

## 2024-12-17 VITALS
BODY MASS INDEX: 32.37 KG/M2 | DIASTOLIC BLOOD PRESSURE: 76 MMHG | HEIGHT: 65 IN | WEIGHT: 194.3 LBS | SYSTOLIC BLOOD PRESSURE: 128 MMHG | OXYGEN SATURATION: 98 % | HEART RATE: 68 BPM

## 2024-12-17 DIAGNOSIS — K21.9 CHRONIC GERD: ICD-10-CM

## 2024-12-17 DIAGNOSIS — R73.03 PREDIABETES: ICD-10-CM

## 2024-12-17 DIAGNOSIS — I15.9 SECONDARY HYPERTENSION: ICD-10-CM

## 2024-12-17 DIAGNOSIS — G43.809 OTHER MIGRAINE WITHOUT STATUS MIGRAINOSUS, NOT INTRACTABLE: ICD-10-CM

## 2024-12-17 DIAGNOSIS — G47.33 OBSTRUCTIVE SLEEP APNEA, ADULT: Primary | ICD-10-CM

## 2024-12-17 LAB
ANION GAP SERPL CALC-SCNC: 11 MMOL/L (ref 10–20)
BUN SERPL-MCNC: 15 MG/DL (ref 6–23)
CALCIUM SERPL-MCNC: 9.4 MG/DL (ref 8.6–10.3)
CHLORIDE SERPL-SCNC: 110 MMOL/L (ref 98–107)
CO2 SERPL-SCNC: 25 MMOL/L (ref 21–32)
CREAT SERPL-MCNC: 0.86 MG/DL (ref 0.5–1.05)
EGFRCR SERPLBLD CKD-EPI 2021: 77 ML/MIN/1.73M*2
EST. AVERAGE GLUCOSE BLD GHB EST-MCNC: 111 MG/DL
GLUCOSE SERPL-MCNC: 83 MG/DL (ref 74–99)
HBA1C MFR BLD: 5.5 %
POTASSIUM SERPL-SCNC: 4.1 MMOL/L (ref 3.5–5.3)
SODIUM SERPL-SCNC: 142 MMOL/L (ref 136–145)

## 2024-12-17 PROCEDURE — 80048 BASIC METABOLIC PNL TOTAL CA: CPT

## 2024-12-17 PROCEDURE — 99214 OFFICE O/P EST MOD 30 MIN: CPT | Performed by: INTERNAL MEDICINE

## 2024-12-17 PROCEDURE — 3074F SYST BP LT 130 MM HG: CPT | Performed by: INTERNAL MEDICINE

## 2024-12-17 PROCEDURE — 83036 HEMOGLOBIN GLYCOSYLATED A1C: CPT

## 2024-12-17 PROCEDURE — 36415 COLL VENOUS BLD VENIPUNCTURE: CPT

## 2024-12-17 PROCEDURE — 3078F DIAST BP <80 MM HG: CPT | Performed by: INTERNAL MEDICINE

## 2024-12-17 PROCEDURE — 3008F BODY MASS INDEX DOCD: CPT | Performed by: INTERNAL MEDICINE

## 2024-12-17 PROCEDURE — 1036F TOBACCO NON-USER: CPT | Performed by: INTERNAL MEDICINE

## 2024-12-17 RX ORDER — TOPIRAMATE 50 MG/1
50 TABLET, FILM COATED ORAL 2 TIMES DAILY
Qty: 200 TABLET | Refills: 1 | Status: SHIPPED | OUTPATIENT
Start: 2024-12-17

## 2024-12-17 RX ORDER — IRBESARTAN 75 MG/1
75 TABLET ORAL DAILY
Qty: 100 TABLET | Refills: 1 | Status: SHIPPED | OUTPATIENT
Start: 2024-12-17

## 2024-12-17 RX ORDER — METFORMIN HYDROCHLORIDE 500 MG/1
1000 TABLET, EXTENDED RELEASE ORAL
Qty: 400 TABLET | Refills: 1 | Status: SHIPPED | OUTPATIENT
Start: 2024-12-17

## 2024-12-17 RX ORDER — PANTOPRAZOLE SODIUM 40 MG/1
40 TABLET, DELAYED RELEASE ORAL DAILY
Qty: 100 TABLET | Refills: 1 | Status: SHIPPED | OUTPATIENT
Start: 2024-12-17 | End: 2025-12-17

## 2024-12-17 ASSESSMENT — ENCOUNTER SYMPTOMS
UNEXPECTED WEIGHT CHANGE: 0
ARTHRALGIAS: 0
NAUSEA: 0
SHORTNESS OF BREATH: 0
CHEST TIGHTNESS: 0
WHEEZING: 0
FATIGUE: 0
COUGH: 0
ABDOMINAL PAIN: 0
BLOOD IN STOOL: 0
BACK PAIN: 0
PALPITATIONS: 0
DIARRHEA: 0
VOMITING: 0

## 2024-12-17 NOTE — PATIENT INSTRUCTIONS
Patient instructions  As we discussed I sent refills for all of your medications to your mail order and please let us know if there are any issues with the pharmacy.  We will also be contacting you with the results of your lab work you had drawn this morning and hopefully everything is within acceptable range.  I am glad that you use your CPAP device faithfully.  Please stay on your aspirin until you see your neurologist.  During your consultation please ask about the prolonged use of aspirin therapy  We will see you back in 6 months and try to get your lab work done at least a few days prior to your appointment.

## 2024-12-17 NOTE — PROGRESS NOTES
Subjective   Patient ID: Carmen Jay is a 59 y.o. female who presents for Follow-up.  HPI  She is here today for her routine 6-month checkup.  Because of her neurological event we ordered an EEG which thankfully came back normal.  She does have an appointment to see the neurologist in January and she continues to take her baby aspirin once a day as prevention.  She is looking well and also reports feeling well.  Her blood pressure control is excellent.  She went for her lab work but not until this morning so we do not have the results back.  We have agreed to contact her with the results and possible recommendations for changes.  Otherwise we went through all of her medications today and we are providing refills.  We decided that we can safely see her back in 6 months for reevaluation.  Also she had a recent sleep study and had an adjustment on her CPAP device.  She has been on CPAP since 2005 and doing well.  Review of Systems   Constitutional:  Negative for fatigue and unexpected weight change.   Respiratory:  Negative for cough, chest tightness, shortness of breath and wheezing.    Cardiovascular:  Negative for chest pain, palpitations and leg swelling.   Gastrointestinal:  Negative for abdominal pain, blood in stool, diarrhea, nausea and vomiting.   Musculoskeletal:  Negative for arthralgias and back pain.     Objective   Physical Exam  Vitals and nursing note reviewed.   Constitutional:       General: She is not in acute distress.     Appearance: Normal appearance.   HENT:      Head: Normocephalic and atraumatic.   Eyes:      Conjunctiva/sclera: Conjunctivae normal.   Cardiovascular:      Rate and Rhythm: Normal rate and regular rhythm.      Heart sounds: Normal heart sounds.   Pulmonary:      Effort: No respiratory distress.      Breath sounds: No wheezing.   Abdominal:      Palpations: Abdomen is soft.      Tenderness: There is no abdominal tenderness. There is no guarding.   Musculoskeletal:          General: No swelling. Normal range of motion.   Skin:     General: Skin is warm and dry.   Neurological:      General: No focal deficit present.      Mental Status: She is alert and oriented to person, place, and time.   Psychiatric:         Behavior: Behavior normal.       Assessment/Plan   Problem List Items Addressed This Visit             ICD-10-CM    Chronic GERD K21.9     -She is doing well on her current medical regimen and we are providing refills today         Relevant Medications    pantoprazole (ProtoNix) 40 mg EC tablet    Hypertension I10     -Currently very well-controlled so we will continue with her current medical regimen         Relevant Medications    irbesartan (Avapro) 75 mg tablet    Migraine G43.909     -Migraines appear to be well-controlled on the Topamax and we are providing refills today         Relevant Medications    topiramate (Topamax) 50 mg tablet    Obstructive sleep apnea, adult - Primary G47.33     -She was diagnosed with obstructive sleep apnea in 2005  -She had a recent study and continues to use her CPAP device faithfully.  She has derived benefit from its use         Prediabetes R73.03     -She is doing well on the metformin and we will continue to monitor         Relevant Medications    metFORMIN  mg 24 hr tablet    Other Relevant Orders    Basic Metabolic Panel    Hemoglobin A1C   Patient instructions  As we discussed I sent refills for all of your medications to your mail order and please let us know if there are any issues with the pharmacy.  We will also be contacting you with the results of your lab work you had drawn this morning and hopefully everything is within acceptable range.  I am glad that you use your CPAP device faithfully.  Please stay on your aspirin until you see your neurologist.  During your consultation please ask about the prolonged use of aspirin therapy  We will see you back in 6 months and try to get your lab work done at least a few days prior  to your appointment.       Katya Martinez, DO

## 2024-12-17 NOTE — ASSESSMENT & PLAN NOTE
-She was diagnosed with obstructive sleep apnea in 2005  -She had a recent study and continues to use her CPAP device faithfully.  She has derived benefit from its use

## 2025-06-18 ENCOUNTER — APPOINTMENT (OUTPATIENT)
Age: 60
End: 2025-06-18
Payer: COMMERCIAL

## 2025-06-18 VITALS
OXYGEN SATURATION: 99 % | SYSTOLIC BLOOD PRESSURE: 122 MMHG | HEIGHT: 65 IN | DIASTOLIC BLOOD PRESSURE: 84 MMHG | HEART RATE: 57 BPM | BODY MASS INDEX: 32.51 KG/M2 | WEIGHT: 195.1 LBS

## 2025-06-18 DIAGNOSIS — I15.9 SECONDARY HYPERTENSION: ICD-10-CM

## 2025-06-18 DIAGNOSIS — K21.9 CHRONIC GERD: ICD-10-CM

## 2025-06-18 DIAGNOSIS — G43.809 OTHER MIGRAINE WITHOUT STATUS MIGRAINOSUS, NOT INTRACTABLE: ICD-10-CM

## 2025-06-18 DIAGNOSIS — G47.33 OBSTRUCTIVE SLEEP APNEA, ADULT: ICD-10-CM

## 2025-06-18 DIAGNOSIS — R73.03 PREDIABETES: ICD-10-CM

## 2025-06-18 DIAGNOSIS — K51.40 PSEUDOPOLYPOSIS OF COLON WITHOUT COMPLICATION, UNSPECIFIED PART OF COLON (MULTI): Primary | ICD-10-CM

## 2025-06-18 PROCEDURE — 1036F TOBACCO NON-USER: CPT | Performed by: INTERNAL MEDICINE

## 2025-06-18 PROCEDURE — 3008F BODY MASS INDEX DOCD: CPT | Performed by: INTERNAL MEDICINE

## 2025-06-18 PROCEDURE — 3074F SYST BP LT 130 MM HG: CPT | Performed by: INTERNAL MEDICINE

## 2025-06-18 PROCEDURE — 99214 OFFICE O/P EST MOD 30 MIN: CPT | Performed by: INTERNAL MEDICINE

## 2025-06-18 PROCEDURE — 3079F DIAST BP 80-89 MM HG: CPT | Performed by: INTERNAL MEDICINE

## 2025-06-18 RX ORDER — METFORMIN HYDROCHLORIDE 500 MG/1
1000 TABLET, EXTENDED RELEASE ORAL
Qty: 400 TABLET | Refills: 1 | Status: SHIPPED | OUTPATIENT
Start: 2025-06-18

## 2025-06-18 RX ORDER — PANTOPRAZOLE SODIUM 40 MG/1
40 TABLET, DELAYED RELEASE ORAL DAILY
Qty: 100 TABLET | Refills: 1 | Status: SHIPPED | OUTPATIENT
Start: 2025-06-18 | End: 2026-06-18

## 2025-06-18 RX ORDER — IRBESARTAN 75 MG/1
75 TABLET ORAL DAILY
Qty: 100 TABLET | Refills: 1 | Status: SHIPPED | OUTPATIENT
Start: 2025-06-18

## 2025-06-18 RX ORDER — TOPIRAMATE 50 MG/1
50 TABLET, FILM COATED ORAL 2 TIMES DAILY
Qty: 200 TABLET | Refills: 1 | Status: SHIPPED | OUTPATIENT
Start: 2025-06-18

## 2025-06-18 RX ORDER — PANTOPRAZOLE SODIUM 40 MG/1
40 TABLET, DELAYED RELEASE ORAL DAILY
Qty: 100 TABLET | Refills: 1 | Status: SHIPPED | OUTPATIENT
Start: 2025-06-18 | End: 2025-06-18 | Stop reason: SDUPTHER

## 2025-06-18 ASSESSMENT — ENCOUNTER SYMPTOMS
WHEEZING: 0
BLOOD IN STOOL: 0
ABDOMINAL DISTENTION: 1
BACK PAIN: 0
VOMITING: 0
SHORTNESS OF BREATH: 0
ARTHRALGIAS: 0
FATIGUE: 0
UNEXPECTED WEIGHT CHANGE: 0
ABDOMINAL PAIN: 1
PALPITATIONS: 0
NAUSEA: 0
DIARRHEA: 0
COUGH: 0

## 2025-06-18 ASSESSMENT — PATIENT HEALTH QUESTIONNAIRE - PHQ9
2. FEELING DOWN, DEPRESSED OR HOPELESS: NOT AT ALL
1. LITTLE INTEREST OR PLEASURE IN DOING THINGS: NOT AT ALL
SUM OF ALL RESPONSES TO PHQ9 QUESTIONS 1 AND 2: 0

## 2025-06-18 NOTE — PROGRESS NOTES
Subjective   Patient ID: Carmen Jay is a 59 y.o. female who presents for Follow-up (6 MO CK).  HPI  She is here today for a routine 6-month checkup.  Today she is looking well but she had a recent experience with right lower quadrant abdominal pain and ended up going to the emergency room on April 28.  There they performed a CAT scan and then she was referred to gastroenterology.  She states she had a recent gastric emptying study which was normal.  She is scheduled to have an anorectal manometry test next.  We are reminded that she has had a relatively recent colonoscopy last year.  She will let us know if things or not going well.  She continues to have some mild abdominal symptoms.  We also remind her that she is due for her annual mammogram and she will call her gynecologist to get that scheduled.  She also had relatively recent lab work and I reviewed that today.  Everything looked good.  She is due for hemoglobin A1c which was drawn this morning.  We will call her with the results once they are known.  We are providing refills on all the medicines that we give her and if everything goes according to plan we will see her back in 6 months for reevaluation.  Review of Systems   Constitutional:  Negative for fatigue and unexpected weight change.   Respiratory:  Negative for cough, shortness of breath and wheezing.    Cardiovascular:  Negative for chest pain, palpitations and leg swelling.   Gastrointestinal:  Positive for abdominal distention and abdominal pain. Negative for blood in stool, diarrhea, nausea and vomiting.   Musculoskeletal:  Negative for arthralgias and back pain.     Objective   Physical Exam  Vitals and nursing note reviewed.   Constitutional:       General: She is not in acute distress.     Appearance: Normal appearance.   HENT:      Head: Normocephalic and atraumatic.   Eyes:      Conjunctiva/sclera: Conjunctivae normal.   Cardiovascular:      Rate and Rhythm: Normal rate and regular  rhythm.      Heart sounds: Normal heart sounds.   Pulmonary:      Effort: No respiratory distress.      Breath sounds: No wheezing.   Abdominal:      Palpations: Abdomen is soft.      Tenderness: There is no abdominal tenderness. There is no guarding.   Musculoskeletal:         General: No swelling. Normal range of motion.   Skin:     General: Skin is warm and dry.   Neurological:      General: No focal deficit present.      Mental Status: She is alert and oriented to person, place, and time.   Psychiatric:         Behavior: Behavior normal.       Assessment/Plan   Problem List Items Addressed This Visit           ICD-10-CM    Chronic GERD K21.9    Relevant Medications    pantoprazole (ProtoNix) 40 mg EC tablet    Hypertension I10    - Her blood pressure control is excellent so we will continue with her current medical regimen         Relevant Medications    irbesartan (Avapro) 75 mg tablet    Migraine G43.909    Her migraine headaches appear to be under adequate control and we are providing a refill on Topamax today-         Relevant Medications    topiramate (Topamax) 50 mg tablet    Obstructive sleep apnea, adult G47.33    - She continues to use her CPAP device faithfully and has derived benefit from its use         Prediabetes R73.03    - We will call her once her hemoglobin A1c results are known and she is reminded to eat a healthy diet and exercise regularly         Relevant Medications    metFORMIN  mg 24 hr tablet    Other Relevant Orders    Basic Metabolic Panel    Hemoglobin A1C    Pseudopolyposis of colon without complication, unspecified part of colon (Multi) - Primary K51.40    - Her last colonoscopy was 3-5-2024 and she will go back regularly for surveillance exams        Patient instructions  As we discussed you are due for your screening mammogram so please call your gynecologist about getting it scheduled.  We sent refills in all the medicines that we provide for you and please let us know if  there are any issues at the pharmacy  Once your hemoglobin A1c result comes back we will notify you  We will otherwise see you back in 6 months and please remember to get lab work done prior to that visit.       Katya Martinez, DO

## 2025-06-18 NOTE — PATIENT INSTRUCTIONS
Patient instructions  As we discussed you are due for your screening mammogram so please call your gynecologist about getting it scheduled.  We sent refills in all the medicines that we provide for you and please let us know if there are any issues at the pharmacy  Once your hemoglobin A1c result comes back we will notify you  We will otherwise see you back in 6 months and please remember to get lab work done prior to that visit.

## 2025-06-18 NOTE — ASSESSMENT & PLAN NOTE
Her migraine headaches appear to be under adequate control and we are providing a refill on Topamax today-

## 2025-06-18 NOTE — ASSESSMENT & PLAN NOTE
- We will call her once her hemoglobin A1c results are known and she is reminded to eat a healthy diet and exercise regularly

## 2025-06-18 NOTE — ASSESSMENT & PLAN NOTE
- Her last colonoscopy was 3-5-2024 and she will go back regularly for surveillance exams   [Dear  ___] : Dear ~NEMO, [Courtesy Letter:] : I had the pleasure of seeing your patient, [unfilled], in my office today. [Please see my note below.] : Please see my note below. [Sincerely,] : Sincerely, [DrJose Carlos  ___] : Dr. NICHOLSON [DrJose Carlos ___] : Dr. NICHOLSON [___] : [unfilled] [FreeTextEntry2] : Niko Daniel MD [FreeTextEntry3] : [place]\par Ceasar Maddox M.D., FACP\par Professor of Medicine\par [employer]\par Associate Chief, Division of Hematology\par [facility]\par [facility] [place]\par [address]\[place] [address]\par [phone]\par \par \par \par

## 2025-06-19 LAB
ANION GAP SERPL CALCULATED.4IONS-SCNC: 9 MMOL/L (CALC) (ref 7–17)
BUN SERPL-MCNC: 18 MG/DL (ref 7–25)
BUN/CREAT SERPL: NORMAL (CALC) (ref 6–22)
CALCIUM SERPL-MCNC: 9.6 MG/DL (ref 8.6–10.4)
CHLORIDE SERPL-SCNC: 108 MMOL/L (ref 98–110)
CO2 SERPL-SCNC: 24 MMOL/L (ref 20–32)
CREAT SERPL-MCNC: 0.94 MG/DL (ref 0.5–1.05)
EGFRCR SERPLBLD CKD-EPI 2021: 69 ML/MIN/1.73M2
EST. AVERAGE GLUCOSE BLD GHB EST-MCNC: 120 MG/DL
EST. AVERAGE GLUCOSE BLD GHB EST-SCNC: 6.6 MMOL/L
GLUCOSE SERPL-MCNC: 86 MG/DL (ref 65–99)
HBA1C MFR BLD: 5.8 %
POTASSIUM SERPL-SCNC: 4.3 MMOL/L (ref 3.5–5.3)
SODIUM SERPL-SCNC: 141 MMOL/L (ref 135–146)

## 2025-12-18 ENCOUNTER — APPOINTMENT (OUTPATIENT)
Age: 60
End: 2025-12-18
Payer: COMMERCIAL

## 2025-12-19 ENCOUNTER — APPOINTMENT (OUTPATIENT)
Age: 60
End: 2025-12-19
Payer: COMMERCIAL